# Patient Record
(demographics unavailable — no encounter records)

---

## 2019-07-31 NOTE — CT
PRELIMINARY REPORT/[facility]/EMERGENCY AFTER

HOURS PROCEDURE: 



EXAM:

CT Head Without Contrast



EXAM DATE/TIME:

7/31/2019 2:03 AM



CLINICAL HISTORY:

36 years old, female; Pain; Headache; Prior surgery; Patient HX: PT reports to er with C/O possible

 shunt malfunction. PT describes pressure to head 7/10, states nerves in my face are twitching

sometimes and my legs feel funny"



TECHNIQUE:

Imaging protocol: Computed tomography images of the head without contrast.



COMPARISON:

No relevant prior studies available.



FINDINGS:

Tubes, catheters and devices: Right frontal approach ventriculostomy catheter terminates within the

right lateral ventricle, near the third ventricle.

Brain: Gustavo cisterna magna. No mass, hemorrhage, or acute infarction.

Ventricles: No hydrocephalus.

Bones/joints: Normal.

Sinuses: Normal as visualized.

Mastoid air cells: Normal as visualized.

Soft tissues: Unremarkable.



IMPRESSION:

1. No acute intracranial abnormality.

2. Right frontal approach ventriculostomy catheter terminates within the right lateral ventricle, ramiro
r the

third ventricle. No hydrocephalus.



Thank you for allowing us to participate in the care of your patient.

Dictated and Authenticated by: Refugio Denny MD

07/31/2019 2:51 AM Central Time ([place] & [place])







FINAL REPORT 



EMERGENCY AFTER HOURS CT BRAIN WITHOUT CONTRAST:



FINDINGS/IMPRESSION:

I agree with the preliminary report given by Dr. Refugio Denny of [facility].  

Comparison was made with exam of 10/25/2017.



Transcribed Date/Time: 7/31/2019 7:57 AM



Reported By: Kwame Smith 

Electronically Signed:  7/31/2019 11:09 AM

## 2019-07-31 NOTE — RAD
Radiograph shuntogram 6 views:



DATE:

7/31/2019



HISTORY:

36-year-old female with headache. Rule out shunt malfunction.



FINDINGS:

 shunt catheter enters through a right frontal alan hole with distal tip near midline. Catheter adrianna
cends right neck, crosses midline of upper chest, and enters abdomen slightly to left of midline,

with distal tip in the right pelvis. No disruption visualized in the radiopaque portions of the slava
ter.



IMPRESSION:

No evidence of ventriculoperitoneal shunt catheter disruption.



Reported By: Otto Cervantes 

Electronically Signed:  7/31/2019 8:02 AM